# Patient Record
Sex: MALE | Race: WHITE | Employment: OTHER | URBAN - METROPOLITAN AREA
[De-identification: names, ages, dates, MRNs, and addresses within clinical notes are randomized per-mention and may not be internally consistent; named-entity substitution may affect disease eponyms.]

---

## 2015-06-26 LAB — COLONOSCOPY, EXTERNAL: NORMAL

## 2017-03-02 ENCOUNTER — OFFICE VISIT (OUTPATIENT)
Dept: ORTHOPEDIC SURGERY | Age: 67
End: 2017-03-02

## 2017-03-02 VITALS
OXYGEN SATURATION: 98 % | SYSTOLIC BLOOD PRESSURE: 110 MMHG | DIASTOLIC BLOOD PRESSURE: 62 MMHG | BODY MASS INDEX: 21.81 KG/M2 | WEIGHT: 161 LBS | HEIGHT: 72 IN | RESPIRATION RATE: 18 BRPM | TEMPERATURE: 98.3 F | HEART RATE: 65 BPM

## 2017-03-02 DIAGNOSIS — M47.899 FACET SYNDROME: ICD-10-CM

## 2017-03-02 DIAGNOSIS — S32.020A COMPRESSION FRACTURE OF L2, CLOSED, INITIAL ENCOUNTER (HCC): ICD-10-CM

## 2017-03-02 DIAGNOSIS — T14.8XXA SPRAIN AND STRAIN: Primary | ICD-10-CM

## 2017-03-02 RX ORDER — CYCLOBENZAPRINE HCL 10 MG
TABLET ORAL
Refills: 0 | COMMUNITY
Start: 2017-02-27 | End: 2017-12-15 | Stop reason: ALTCHOICE

## 2017-03-02 RX ORDER — HYDROCODONE BITARTRATE AND ACETAMINOPHEN 10; 325 MG/1; MG/1
TABLET ORAL
Refills: 0 | COMMUNITY
Start: 2017-02-27 | End: 2017-12-15 | Stop reason: ALTCHOICE

## 2017-03-02 RX ORDER — METHYLPREDNISOLONE 4 MG/1
TABLET ORAL
Refills: 0 | COMMUNITY
Start: 2017-02-27 | End: 2017-12-15 | Stop reason: ALTCHOICE

## 2017-03-02 NOTE — PROGRESS NOTES
[facility] [address].  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 He has had a lumbar CT which shows an L2 compression fracture. However, his pain appears to be attributed to a strain/sprain from his fall. He will do a HEP for his pain which will hopefully relieve itself with more time. Pt was advised to speak to his PCP about his bone density. Pt will f/u prn. Carla Canchola was seen today for back pain. Diagnoses and all orders for this visit:    Sprain and strain    Compression fracture of L2, closed, [facility]    Facet syndrome            Follow-up Disposition:  Return if symptoms worsen or fail to improve. CHIEF COMPLAINT  Skyler York is seen today in consultation at the request of Ruiz Loera MD for complaints of lumbar pain. HISTORY OF PRESENT ILLNESS  Skyler York is a 61 y.o. 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 Pt states he previously had SI joints several years ago which provided great relief. He was prescribed Norco and Prednisone recently but states that he did not take them. Coughing and sneezing exacerbate his pain. Pt denies any fevers, chills, nausea, vomiting. Pt denies any chest pain and shortness of breath. Pt denies any ear, nose, and throat problems. Pt denies any fecal or urinary incontinence. He works as a  part time.       PAST MEDICAL HISTORY   Past Medical History:   Diagnosis Date    BPH (benign prostatic hyperplasia)        Past Surgical History:   Procedure Laterality Date    HX CYST REMOVAL      ankle  HX OTHER SURGICAL      lap cholycystectomy post  proscedure oderset    HX OTHER SURGICAL      orthopedic surgery knee    HX OTHER SURGICAL      orthopedic surgery disc in neck       MEDICATIONS      Current Outpatient Prescriptions   Medication Sig Dispense Refill    SAW PALMETTO PO Take 300 mg by mouth.  RABEprazole (ACIPHEX) 20 mg tablet Take 20 mg by mouth daily.  GLUCOSAMINE/CHONDROITIN SULF A (GLUCOSAMINE-CHONDROITIN PO) Take  by mouth.  METOPROLOL SUCCINATE (TOPROL XL PO) Take  by mouth.  aspirin 81 mg tablet Take 81 mg by mouth.  cyclobenzaprine (FLEXERIL) 10 mg tablet   0    HYDROcodone-acetaminophen (NORCO)  mg tablet   0    methylPREDNISolone (MEDROL DOSEPACK) 4 mg tablet   0    TOPROL XL 25 mg XL tablet          ALLERGIES    Allergies   Allergen Reactions    Bactrim [Sulfamethoprim Ds] Nausea Only    Levaquin [Levofloxacin] Anxiety     Med him real sweaty          SOCIAL HISTORY    Social History     Social History    Marital status:      Spouse name: N/A    Number of children: N/A    Years of education: N/A     Social History Main Topics    Smoking status: Former Smoker    Smokeless tobacco: None    Alcohol use Yes    Drug use: None    Sexual activity: Not Asked     Other Topics Concern    None     Social History Narrative     Social History Narrative      Problem Relation Age of Onset    Cancer Other          REVIEW OF SYSTEMS  Review of Systems   Constitutional: Negative for chills, diaphoresis, fever, malaise/fatigue and weight loss. Respiratory: Negative for shortness of breath. Cardiovascular: Negative for chest pain and leg swelling. Gastrointestinal: Negative for constipation, nausea and vomiting. Neurological: Negative for dizziness, tingling, seizures, loss of consciousness and headaches. Psychiatric/Behavioral: The patient does not have insomnia.           PHYSICAL EXAMINATION  Visit Vitals    /62    Pulse 65    Temp 98.3 °F (36.8 °C) (Oral)    Resp 18    Ht 6' (1.829 m)    Wt 161 lb (73 kg)    SpO2 98%    BMI 21.84 kg/m2         No flowsheet data found. Constitutional:  Well developed, well nourished, in no acute distress. Psychiatric: Affect and mood are appropriate. HEENT: Normocephalic, atraumatic. Extraocular movements intact. Integumentary: No rashes or abrasions noted on exposed areas. Cardiovascular: Regular rate and rhythm. Pulmonary: Clear to auscultation bilaterally. SPINE/MUSCULOSKELETAL EXAM    Cervical spine:  Neck is midline. Normal muscle tone. No focal atrophy is noted. ROM pain free. Shoulder ROM intact. Tenderness to palpation. Negative Spurling's sign. Negative Tinel's sign. Negative Day's sign. Sensation in the bilateral arms grossly intact to light touch. Lumbar spine:  No rash, ecchymosis, or gross obliquity. No fasciculations. No focal atrophy is noted. No pain with hip ROM. Full range of motion. Tenderness to palpation. No tenderness to palpation at the sciatic notch. SI joints non-tender. Trochanters non tender. Pain with back extension. Sensation in the bilateral legs grossly intact to light touch. MOTOR:      Biceps  Triceps Deltoids Wrist Ext Wrist Flex Hand Intrin   Right 5/5 5/5 5/5 5/5 5/5 5/5   Left 5/5 5/5 5/5 5/5 5/5 5/5             Hip Flex  Quads Hamstrings Ankle DF EHL Ankle PF   Right 5/5 5/5 5/5 5/5 5/5 5/5   Left 5/5 5/5 5/5 5/5 5/5 5/5     DTRs are 2+ biceps, triceps, brachioradialis, patella, and Achilles. Negative Straight Leg raise. Squat not tested. No difficulty with tandem gait. Ambulation without assistive device. FWB. RADIOGRAPHS  Lumbar CT images taken on 2/27/2017 personally reviewed with patient:  Findings:    Bones: Mild acute superior endplate compression fracture of L2 with 30% loss in height and preservation of the posterior column.  Chronic right-sided spondylolysis at L5. Small T11 hemangioma. Alignment: Mild convex right curvature. 2 mm retrolisthesis L3 on L4, 1 mm L2 on L3, and 3 mm anterior subluxation L5 on S1. Disc interspaces and endplates: Moderate to severe degenerative disc narrowing at L3-4 and L5-S1, moderate at L2-3 mild at L1-2. Mild to moderate generalized endplate spurring relatively sparing L4-5 and T12-L1    Spinal canal: No significant central canal stenosis. Facet joints and posterior elements: Moderate bilateral L4-5 and severe L5-S1 degenerative facet arthropathy on the left and mild to moderate on the right. Foramina: Moderate to severe bilateral foraminal narrowing at L5-S1, moderate bilateral L3-4 and L4-5 and mild to moderate bilateral L2-3 and L1-2.  reviewed    Mr. Lennox Host has a reminder for a \"due or due soon\" health maintenance. I have asked that he contact his primary care provider for follow-up on this health maintenance. This plan was reviewed with the patient and patient agrees. All questions were answered. More than half of this visit today was spent on counseling. Written by Jonatan Morgan, as dictated by Dr. Sushant Olmedo. I, Dr. Sushant Olmedo, confirm that all documentation is accurate.

## 2017-03-02 NOTE — PATIENT INSTRUCTIONS
Low Back Pain: Exercises  Your Care Instructions  Here are some examples of typical rehabilitation exercises for your condition. Start each exercise slowly. Ease off the exercise if you start to have pain. Your doctor or physical therapist will tell you when you can start these exercises and which ones will work best for you. How to do the exercises  Press-up    1. Lie on your stomach, supporting your body with your forearms. 2. Press your elbows down into the floor to raise your upper back. As you do this, relax your stomach muscles and allow your back to arch without using your back muscles. As your press up, do not let your hips or pelvis come off the floor. 3. Hold for 15 to 30 seconds, then relax. 4. Repeat 2 to 4 times. Alternate arm and leg (bird dog) exercise    Note: Do this exercise slowly. Try to keep your body straight at all times, and do not let one hip drop lower than the other. 1. Start on the floor, on your hands and knees. 2. Tighten your belly muscles. 3. Raise one leg off the floor, and hold it straight out behind you. Be careful not to let your hip drop down, because that will twist your trunk. 4. Hold for about 6 seconds, then lower your leg and switch to the other leg. 5. Repeat 8 to 12 times on each leg. 6. Over time, work up to holding for 10 to 30 seconds each time. 7. If you feel stable and secure with your leg raised, try raising the opposite arm straight out in front of you at the same time. Knee-to-chest exercise    1. Lie on your back with your knees bent and your feet flat on the floor. 2. Bring one knee to your chest, keeping the other foot flat on the floor (or keeping the other leg straight, whichever feels better on your lower back). 3. Keep your lower back pressed to the floor. Hold for at least 15 to 30 seconds. 4. Relax, and lower the knee to the starting position. 5. Repeat with the other leg. Repeat 2 to 4 times with each leg.   6. To get more stretch, put your other leg flat on the floor while pulling your knee to your chest.  Curl-ups    1. Lie on the floor on your back with your knees bent at a 90-degree angle. Your feet should be flat on the floor, about 12 inches from your buttocks. 2. Cross your arms over your chest. If this bothers your neck, try putting your hands behind your neck (not your head), with your elbows spread apart. 3. Slowly tighten your belly muscles and raise your shoulder blades off the floor. 4. Keep your head in line with your body, and do not press your chin to your chest.  5. Hold this position for 1 or 2 seconds, then slowly lower yourself back down to the floor. 6. Repeat 8 to 12 times. Pelvic tilt exercise    1. Lie on your back with your knees bent. 2. \"Brace\" your stomach. This means to tighten your muscles by pulling in and imagining your belly button moving toward your spine. You should feel like your back is pressing to the floor and your hips and pelvis are rocking back. 3. Hold for about 6 seconds while you breathe smoothly. 4. Repeat 8 to 12 times. Heel dig bridging    1. Lie on your back with both knees bent and your ankles bent so that only your heels are digging into the floor. Your knees should be bent about 90 degrees. 2. Then push your heels into the floor, squeeze your buttocks, and lift your hips off the floor until your shoulders, hips, and knees are all in a straight line. 3. Hold for about 6 seconds as you continue to breathe normally, and then slowly lower your hips back down to the floor and rest for up to 10 seconds. 4. Do 8 to 12 repetitions. Hamstring stretch in doorway    1. Lie on your back in a doorway, with one leg through the open door. 2. Slide your leg up the wall to straighten your knee. You should feel a gentle stretch down the back of your leg. 3. Hold the stretch for at least 15 to 30 seconds. Do not arch your back, point your toes, or bend either knee.  Keep one heel touching the floor and the other heel touching the wall. 4. Repeat with your other leg. 5. Do 2 to 4 times for each leg. Hip flexor stretch    1. Kneel on the floor with one knee bent and one leg behind you. Place your forward knee over your foot. Keep your other knee touching the floor. 2. Slowly push your hips forward until you feel a stretch in the upper thigh of your rear leg. 3. Hold the stretch for at least 15 to 30 seconds. Repeat with your other leg. 4. Do 2 to 4 times on each side. Wall sit    1. Stand with your back 10 to 12 inches away from a wall. 2. Lean into the wall until your back is flat against it. 3. Slowly slide down until your knees are slightly bent, pressing your lower back into the wall. 4. Hold for about 6 seconds, then slide back up the wall. 5. Repeat 8 to 12 times. Follow-up care is a key part of your treatment and safety. Be sure to make and go to all appointments, and call your doctor if you are having problems. It's also a good idea to know your test results and keep a list of the medicines you take. Where can you learn more? Go to http://nura-milton.info/. Enter J592 in the search box to learn more about \"Low Back Pain: Exercises. \"  Current as of: May 23, 2016  Content Version: 11.1  © 8892-4524 [employer]. Care instructions adapted under license by [employer] (which disclaims liability or warranty for this information). If you have questions about a medical condition or this instruction, always ask your healthcare professional. [place] [address] any warranty or liability for your use of this information. Healthy Upper Back: Exercises  Your Care Instructions  Here are some examples of exercises for your upper back. Start each exercise slowly. Ease off the exercise if you start to have pain.   Your doctor or physical therapist will tell you when you can start these exercises and which ones will work best for you.  How to do the exercises  Lower neck and upper back stretch    5. Stretch your arms out in front of your body. Clasp one hand on top of your other hand. 6. Gently reach out so that you feel your shoulder blades stretching away from each other. 7. Gently bend your head forward. 8. Hold for 15 to 30 seconds. 9. Repeat 2 to 4 times. Midback stretch    Note: If you have knee pain, do not do this exercise. 8. Kneel on the floor, and sit back on your ankles. 9. Lean forward, place your hands on the floor, and stretch your arms out in front of you. Rest your head between your arms. 10. Gently push your chest toward the floor, reaching as far in front of you as possible. 11. Hold for 15 to 30 seconds. 12. Repeat 2 to 4 times. Shoulder rolls    7. Sit comfortably with your feet shoulder-width apart. You can also do this exercise while standing. 8. Roll your shoulders up, then back, and then down in a smooth, circular motion. 9. Repeat 2 to 4 times. Wall push-up    7. Stand against a wall with your feet about 12 to 24 inches back from the wall. If you feel any pain when you do this exercise, stand closer to the wall. 8. Place your hands on the wall slightly wider apart than your shoulders, and lean forward. 9. Gently lean your body toward the wall. Then push back to your starting position. Keep the motion smooth and controlled. 10. Repeat 8 to 12 times. Resisted shoulder blade squeeze    Note: For this exercise, you will need elastic exercise material, such as surgical tubing or Thera-Band.  5. Sit or stand, holding the band in both hands in front of you. Keep your elbows close to your sides, bent at a 90-degree angle. Your palms should face up. 6. Squeeze your shoulder blades together, and move your arms to the outside, stretching the band. Be sure to keep your elbows at your sides while you do this. 7. Relax. 8. Repeat 8 to 12 times.   Resisted rows    Note: For this exercise, you will need elastic exercise material, such as surgical tubing or Thera-Band. 5. Put the band around a solid object, such as a bedpost, at about waist level. Hold one end of the band in each hand. 6. With your elbows at your sides and bent to 90 degrees, pull the band back to move your shoulder blades toward each other. Return to the starting position. 7. Repeat 8 to 12 times. Follow-up care is a key part of your treatment and safety. Be sure to make and go to all appointments, and call your doctor if you are having problems. It's also a good idea to know your test results and keep a list of the medicines you take. Where can you learn more? Go to http://nura-milton.info/. Enter I323 in the search box to learn more about \"Healthy Upper Back: Exercises. \"  Current as of: May 23, 2016  Content Version: 11.1  © 6221-0814 [employer]. Care instructions adapted under license by [employer] (which disclaims liability or warranty for this information). If you have questions about a medical condition or this instruction, always ask your healthcare professional. [place] [address] any warranty or liability for your use of this information. Neck: Exercises  Your Care Instructions  Here are some examples of typical rehabilitation exercises for your condition. Start each exercise slowly. Ease off the exercise if you start to have pain. Your doctor or physical therapist will tell you when you can start these exercises and which ones will work best for you. How to do the exercises  Note: Stretching should make you feel a gentle stretch, but no pain. Stop any strengthening exercise that makes pain worse. Neck stretch    10. This stretch works best if you keep your shoulder down as you lean away from it. To help you remember to do this, start by relaxing your shoulders and lightly holding on to your thighs or your chair.   11. Tilt your head toward your shoulder and hold for 15 to 30 seconds. Let the weight of your head stretch your muscles. 12. If you would like a little added stretch, use your hand to gently and steadily pull your head toward your shoulder. For example, keeping your right shoulder down, lean your head to the left. 13. Repeat 2 to 4 times toward each shoulder. Diagonal neck stretch    13. Turn your head slightly toward the direction you will be stretching, and tilt your head diagonally toward your chest and hold for 15 to 30 seconds. 14. If you would like a little added stretch, use your hand to gently and steadily pull your head forward on the diagonal.  15. Repeat 2 to 4 times toward each side. Dorsal glide stretch    10. Sit or stand tall and look straight ahead. 11. Slowly tuck your chin as you glide your head backward over your body  12. Hold for a count of 6, and then relax for up to 10 seconds. 13. Repeat 8 to 12 times. Note: The dorsal glide stretches the back of the neck. If you feel pain, do not glide so far back. Some people find this exercise easier to do while lying on their backs with an ice pack on the neck. Chest and shoulder stretch    11. Sit or stand tall and glide your head backward as in the dorsal glide stretch. 12. Raise both arms so that your hands are next to your ears. 13. Take a deep breath, and as you breathe out, lower your elbows down and behind your back. You will feel your shoulder blades slide down and together, and at the same time you will feel a stretch across your chest and the front of your shoulders. 14. Hold for about 6 seconds, and then relax for up to 10 seconds. 15. Repeat 8 to 12 times. Strengthening: Hands on head    9. Move your head backward, forward, and side to side against gentle pressure from your hands, holding each position for about 6 seconds. 10. Repeat 8 to 12 times. Follow-up care is a key part of your treatment and safety.  Be sure to make and go to all appointments, and call your doctor if you are having problems. It's also a good idea to know your test results and keep a list of the medicines you take. Where can you learn more? Go to http://nura-milton.info/. Enter P975 in the search box to learn more about \"Neck: Exercises. \"  Current as of: May 23, 2016  Content Version: 11.1  © 5841-5443 [employer]. Care instructions adapted under license by [employer] (which disclaims liability or warranty for this information). If you have questions about a medical condition or this instruction, always ask your healthcare professional. [place] [address] any warranty or liability for your use of this information.

## 2018-08-02 LAB — LDL-C, EXTERNAL: 116

## 2019-06-07 PROBLEM — K21.9 GASTROESOPHAGEAL REFLUX DISEASE WITHOUT ESOPHAGITIS: Status: ACTIVE | Noted: 2017-06-19

## 2019-06-07 PROBLEM — I49.3 PVC'S (PREMATURE VENTRICULAR CONTRACTIONS): Status: ACTIVE | Noted: 2017-06-19

## 2019-06-07 PROBLEM — G89.29 CHRONIC PAIN OF RIGHT KNEE: Status: ACTIVE | Noted: 2017-12-19

## 2019-06-07 PROBLEM — M17.11 PRIMARY LOCALIZED OSTEOARTHRITIS OF RIGHT KNEE: Status: ACTIVE | Noted: 2018-06-18

## 2019-06-07 PROBLEM — M25.561 CHRONIC PAIN OF RIGHT KNEE: Status: ACTIVE | Noted: 2017-12-19

## 2020-10-27 ENCOUNTER — OFFICE VISIT (OUTPATIENT)
Dept: INTERNAL MEDICINE CLINIC | Age: 70
End: 2020-10-27
Payer: COMMERCIAL

## 2020-10-27 VITALS
DIASTOLIC BLOOD PRESSURE: 70 MMHG | RESPIRATION RATE: 20 BRPM | HEIGHT: 73 IN | TEMPERATURE: 97.5 F | BODY MASS INDEX: 21.47 KG/M2 | WEIGHT: 162 LBS | SYSTOLIC BLOOD PRESSURE: 110 MMHG

## 2020-10-27 DIAGNOSIS — T14.8XXA PUNCTURE WOUND: Primary | ICD-10-CM

## 2020-10-27 PROCEDURE — 99213 OFFICE O/P EST LOW 20 MIN: CPT | Performed by: INTERNAL MEDICINE

## 2020-10-27 RX ORDER — MUPIROCIN 20 MG/G
OINTMENT TOPICAL 2 TIMES DAILY
Qty: 22 G | Refills: 1 | Status: SHIPPED | OUTPATIENT
Start: 2020-10-27 | End: 2021-02-25 | Stop reason: ALTCHOICE

## 2020-10-27 RX ORDER — SULFAMETHOXAZOLE AND TRIMETHOPRIM 800; 160 MG/1; MG/1
1 TABLET ORAL 2 TIMES DAILY
Qty: 20 TAB | Refills: 0 | Status: SHIPPED | OUTPATIENT
Start: 2020-10-27 | End: 2020-11-04

## 2020-10-27 NOTE — PROGRESS NOTES
Jae Wesley is a 78 y.o. male presenting for           Chief Complaint   Patient presents with    Puncture Wound        HPI leg wound x 3 weeks,clear drainage surrounding erythema. no fever    Past Medical History:   Diagnosis Date    Benign prostatic hyperplasia with urinary retention     BPH (benign prostatic hyperplasia)     BPH with obstruction/lower urinary tract symptoms     Epididymal cyst     Epididymitis, left     Incomplete bladder emptying         Current Outpatient Medications on File Prior to Visit   Medication Sig Dispense Refill    finasteride (PROSCAR) 5 mg tablet Take 1 Tab by mouth daily. 30 Tab 0    meloxicam (MOBIC) 15 mg tablet take 1 tablet by mouth once daily  0    saw palmetto 160 mg cap Take  by mouth.  esomeprazole (NEXIUM) 20 mg capsule Take  by mouth daily.  GLUCOSAMINE/CHONDROITIN SULF A (GLUCOSAMINE-CHONDROITIN PO) Take  by mouth.  METOPROLOL SUCCINATE (TOPROL XL PO) Take  by mouth.  aspirin 81 mg tablet Take 81 mg by mouth.  finasteride (PROSCAR) 5 mg tablet Take 1 Tab by mouth daily. 90 Tab 4    tamsulosin (FLOMAX) 0.4 mg capsule Take 1 Cap by mouth daily (after dinner). 90 Cap 3    tamsulosin (FLOMAX) 0.4 mg capsule Take 1 Cap by mouth daily (after dinner). 30 Cap 3    finasteride (PROSCAR) 5 mg tablet Take 1 Tab by mouth daily. 90 Tab 3     No current facility-administered medications on file prior to visit. ROS as in hpi    Visit Vitals  /70 (BP 1 Location: Right arm, BP Patient Position: Sitting)   Temp 97.5 °F (36.4 °C)   Resp 20   Ht 6' 1\" (1.854 m)   Wt 162 lb (73.5 kg)   BMI 21.37 kg/m²        Physical Exam 5x3 cm open superficial wound, surrounding erythema.     Assessment & PlanAbx, mupirocin

## 2021-01-18 VITALS
HEART RATE: 60 BPM | WEIGHT: 164 LBS | HEIGHT: 72 IN | SYSTOLIC BLOOD PRESSURE: 128 MMHG | TEMPERATURE: 98.4 F | BODY MASS INDEX: 22.21 KG/M2 | DIASTOLIC BLOOD PRESSURE: 70 MMHG

## 2021-01-18 PROBLEM — J01.90 ACUTE SINUSITIS: Status: ACTIVE | Noted: 2021-01-18

## 2021-01-18 PROBLEM — K21.9 GERD (GASTROESOPHAGEAL REFLUX DISEASE): Status: ACTIVE | Noted: 2021-01-18

## 2021-01-18 PROBLEM — M19.90 OSTEOARTHROSIS: Status: ACTIVE | Noted: 2021-01-18

## 2021-01-18 PROBLEM — E78.5 DYSLIPIDEMIA: Status: ACTIVE | Noted: 2021-01-18

## 2021-01-18 PROBLEM — N41.9 PROSTATITIS: Status: ACTIVE | Noted: 2021-01-18

## 2021-01-18 PROBLEM — N40.0 BPH (BENIGN PROSTATIC HYPERPLASIA): Status: ACTIVE | Noted: 2021-01-18

## 2021-01-18 PROBLEM — I49.3 PVCS (PREMATURE VENTRICULAR CONTRACTIONS): Status: ACTIVE | Noted: 2021-01-18

## 2021-01-18 RX ORDER — CYCLOBENZAPRINE HCL 10 MG
TABLET ORAL
COMMUNITY

## 2021-01-18 RX ORDER — DIAZEPAM 5 MG/1
5 TABLET ORAL
COMMUNITY

## 2021-01-18 RX ORDER — PREGABALIN 75 MG/1
CAPSULE ORAL
COMMUNITY
End: 2021-02-25 | Stop reason: ALTCHOICE

## 2021-01-18 RX ORDER — TRAMADOL HYDROCHLORIDE 50 MG/1
50 TABLET ORAL
COMMUNITY
End: 2021-02-25

## 2021-01-18 RX ORDER — METHYLPREDNISOLONE 4 MG/1
TABLET ORAL
COMMUNITY
End: 2021-02-25 | Stop reason: ALTCHOICE

## 2021-01-18 RX ORDER — HYDROCODONE BITARTRATE AND ACETAMINOPHEN 10; 325 MG/1; MG/1
1 TABLET ORAL
COMMUNITY
End: 2021-02-25 | Stop reason: ALTCHOICE

## 2021-01-18 RX ORDER — OMEPRAZOLE 20 MG/1
20 CAPSULE, DELAYED RELEASE ORAL DAILY
COMMUNITY
End: 2021-02-25 | Stop reason: ALTCHOICE

## 2021-01-18 RX ORDER — PREDNISONE 10 MG/1
TABLET ORAL
COMMUNITY
End: 2021-02-25 | Stop reason: ALTCHOICE

## 2021-01-18 RX ORDER — AZITHROMYCIN 250 MG/1
250 TABLET, FILM COATED ORAL DAILY
COMMUNITY
End: 2021-02-25 | Stop reason: ALTCHOICE

## 2021-01-18 RX ORDER — BROMPHENIRAMINE MALEATE, PSEUDOEPHEDRINE HYDROCHLORIDE, AND DEXTROMETHORPHAN HYDROBROMIDE 2; 30; 10 MG/5ML; MG/5ML; MG/5ML
SYRUP ORAL
COMMUNITY
End: 2021-02-25 | Stop reason: ALTCHOICE

## 2021-01-18 RX ORDER — CIPROFLOXACIN 500 MG/1
TABLET ORAL 2 TIMES DAILY
COMMUNITY
End: 2021-02-25 | Stop reason: ALTCHOICE

## 2021-01-18 RX ORDER — AMOXICILLIN 500 MG/1
500 CAPSULE ORAL
COMMUNITY
End: 2021-02-25 | Stop reason: ALTCHOICE

## 2021-01-18 RX ORDER — RABEPRAZOLE SODIUM 20 MG/1
20 TABLET, DELAYED RELEASE ORAL DAILY
COMMUNITY
End: 2021-02-25 | Stop reason: ALTCHOICE

## 2021-01-18 RX ORDER — OXYCODONE HYDROCHLORIDE 5 MG/1
5 TABLET ORAL
COMMUNITY
End: 2021-02-25 | Stop reason: ALTCHOICE

## 2021-02-25 ENCOUNTER — OFFICE VISIT (OUTPATIENT)
Dept: INTERNAL MEDICINE CLINIC | Age: 71
End: 2021-02-25
Payer: COMMERCIAL

## 2021-02-25 VITALS
SYSTOLIC BLOOD PRESSURE: 116 MMHG | WEIGHT: 166.6 LBS | DIASTOLIC BLOOD PRESSURE: 66 MMHG | HEIGHT: 73 IN | RESPIRATION RATE: 20 BRPM | BODY MASS INDEX: 22.08 KG/M2 | TEMPERATURE: 97.3 F

## 2021-02-25 DIAGNOSIS — R35.1 BENIGN PROSTATIC HYPERPLASIA WITH NOCTURIA: Primary | ICD-10-CM

## 2021-02-25 DIAGNOSIS — N40.1 BENIGN PROSTATIC HYPERPLASIA WITH NOCTURIA: Primary | ICD-10-CM

## 2021-02-25 DIAGNOSIS — I49.3 PVC'S (PREMATURE VENTRICULAR CONTRACTIONS): ICD-10-CM

## 2021-02-25 DIAGNOSIS — N50.3 EPIDIDYMAL CYST: ICD-10-CM

## 2021-02-25 PROCEDURE — 99213 OFFICE O/P EST LOW 20 MIN: CPT | Performed by: INTERNAL MEDICINE

## 2021-02-25 RX ORDER — METOPROLOL SUCCINATE 25 MG/1
25 TABLET, EXTENDED RELEASE ORAL DAILY
Qty: 90 TAB | Refills: 3 | Status: SHIPPED | OUTPATIENT
Start: 2021-02-25

## 2021-02-25 NOTE — PROGRESS NOTES
Fady Hay is a 78 y.o. male presenting for evaluation prior to a planned laser prostatectomy by Dr. Myra Ware          Chief Complaint   Patient presents with    Pre-op Exam        HPI Balaji Velasquez has been seen struggling with prostate issues for all a while now. He is intolerant of Flomax which causes orthostasis and he is now looking forward to having a laser prostatectomy done by Dr. Myra Ware. While he is having the surgery he is going to have a 2 x 2 inch epididymal cyst removed at the same time. He says the cyst is uncomfortable and hurts from time to time and Dr. Myra Ware has agreed to do the procedures at the same time. 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 He stays active without limitation. He has a history of PVCs that are well controlled with medication and sees cardiology once a year. Past Medical History:   Diagnosis Date    Benign prostatic hyperplasia with urinary retention     BPH (benign prostatic hyperplasia)     BPH with obstruction/lower urinary tract symptoms     Epididymal cyst     Epididymitis, left     Incomplete bladder emptying         Current Outpatient Medications on File Prior to Visit   Medication Sig Dispense Refill    bisacodyL (DULCOLAX) 5 mg EC tablet bisacodyl 5 mg tablet,delayed release   take 5 milligrams by mouth as directed      finasteride (PROSCAR) 5 mg tablet Take 1 Tab by mouth daily. 90 Tab 4    vit A/vit C/vit E/zinc/copper (OCUVITE PRESERVISION PO) Take  by mouth.  cyclobenzaprine (FLEXERIL) 10 mg tablet Take  by mouth three (3) times daily as needed for Muscle Spasm(s).  diazePAM (VALIUM) 5 mg tablet Take 5 mg by mouth every six (6) hours as needed for Anxiety.  saw palmetto 160 mg cap Take  by mouth.  esomeprazole (NEXIUM) 20 mg capsule Take  by mouth daily.  METOPROLOL SUCCINATE (TOPROL XL PO) Take 25 mg by mouth daily.  aspirin 81 mg tablet Take 81 mg by mouth.         [DISCONTINUED] polyethylene glycol (MIRALAX) 17 gram/dose powder Laxative PEG 3350 17 gram/dose oral powder   take as directed in [address] [facility] [DISCONTINUED] ciprofloxacin HCl (CIPRO) 500 mg tablet Take  by mouth two (2) times a day.  [DISCONTINUED] omeprazole (PRILOSEC) 20 mg capsule Take 20 mg by mouth daily.  [DISCONTINUED] oxyCODONE IR (ROXICODONE) 5 mg immediate release tablet Take 5 mg by mouth every four (4) hours as needed for Pain.  [DISCONTINUED] pregabalin (LYRICA) 75 mg capsule Take  by mouth.  [DISCONTINUED] traMADoL (ULTRAM) 50 mg tablet Take 50 mg by mouth every six (6) hours as needed for Pain.  [DISCONTINUED] amoxicillin (AMOXIL) 500 mg capsule Take 500 mg by mouth.  [DISCONTINUED] azithromycin (ZITHROMAX) 250 mg tablet Take 250 mg by mouth daily.  [DISCONTINUED] brompheniramine-pseudoeph-DM (DIMETAPP) 2-30-10 mg/5 mL syrup Take  by mouth four (4) times daily as needed for Congestion, Cough or Rhinitis.  [DISCONTINUED] HYDROcodone-acetaminophen (NORCO)  mg tablet Take 1 Tab by mouth.  [DISCONTINUED] methylPREDNISolone (MEDROL DOSEPACK) 4 mg tablet Take  by mouth.  [DISCONTINUED] predniSONE (DELTASONE) 10 mg tablet Take  by mouth daily (with breakfast).  [DISCONTINUED] RABEprazole (ACIPHEX) 20 mg TbEC Take 20 mg by mouth daily.  [DISCONTINUED] tamsulosin HCl (TAMSULOSIN PO) Take  by mouth.  [DISCONTINUED] mupirocin (BACTROBAN) 2 % ointment Apply  to affected area two (2) times a day. 22 g 1    [DISCONTINUED] meloxicam (MOBIC) 15 mg tablet take 1 tablet by mouth once daily  0    [DISCONTINUED] GLUCOSAMINE/CHONDROITIN SULF A (GLUCOSAMINE-CHONDROITIN PO) Take  by mouth. No current facility-administered medications on file prior to visit. ROS all systems are reviewed and negative.     Visit Vitals  /66 (BP 1 Location: Right arm, BP Patient Position: Sitting, BP Cuff Size: Adult)   Temp 97.3 °F (36.3 °C)   Resp 20   Ht 6' 1\" (1.854 m)   Wt 166 lb 9.6 oz (75.6 kg)   BMI 21.98 kg/m²        Physical Exam well-developed well-nourished  man alert oriented cooperative no acute distress normocephalic conjunctiva pink sclera anicteric neck supple no lymphadenopathy lungs bilaterally clear to auscultation heart regular rhythm no extrasystoles appreciated today abdomen is soft and nontender there is no mass or organomegaly no rebound or peritoneal irritation extremities reveals excellent posterior tibial and dorsalis pedis pulses bilaterally neurological is physiologic I did not examine his genitalia as that is performed by Dr. Samara Saint. Assessment & Plan Anais Tang says he has a paper in his truck ordering an EKG chest x-ray and lab work and he is going to [place] when he leaves here to have that done. He told me that he would have them send me copies of the results.   Barring any unforeseen circumstances if his preop testing looks okay I feel like he would be an acceptable candidate for the planned surgical procedure

## 2021-02-25 NOTE — PROGRESS NOTES
Manuel Larose presents today for   Chief Complaint   Patient presents with    Pre-op Exam       Is someone accompanying this pt? no    Is the patient using any DME equipment during OV? no    Depression Screening:  3 most recent PHQ Screens 2/25/2021   Little interest or pleasure in doing things Not at all   Feeling down, depressed, irritable, or hopeless Not at all   Total Score PHQ 2 0       Learning Assessment:  Learning Assessment 2/25/2021   PRIMARY LEARNER Patient   HIGHEST LEVEL OF EDUCATION - PRIMARY LEARNER  2 YEARS Balaji PRIMARY LEARNER NONE   CO-LEARNER CAREGIVER No   PRIMARY LANGUAGE ENGLISH   LEARNER PREFERENCE PRIMARY VIDEOS   ANSWERED BY patient   RELATIONSHIP SELF       Abuse Screening:  Abuse Screening Questionnaire 2/25/2021   Do you ever feel afraid of your partner? N   Are you in a relationship with someone who physically or mentally threatens you? N   Is it safe for you to go home? Y       Fall Risk  Fall Risk Assessment, last 12 mths 2/25/2021   Able to walk? Yes   Fall in past 12 months? 0   Do you feel unsteady? 0   Are you worried about falling 0       ADL  ADL Assessment 2/25/2021   Feeding yourself No Help Needed   Getting from bed to chair No Help Needed   Getting dressed No Help Needed   Bathing or showering No Help Needed   Walk across the room (includes cane/walker) No Help Needed   Using the telphone No Help Needed   Taking your medications No Help Needed   Preparing meals No Help Needed   Managing money (expenses/bills) No Help Needed   Moderately strenuous housework (laundry) No Help Needed   Shopping for personal items (toiletries/medicines) No Help Needed   Shopping for groceries No Help Needed   Driving No Help Needed   Climbing a flight of stairs No Help Needed   Getting to places beyond walking distances No Help Needed       Health Maintenance reviewed and discussed and ordered per Provider.     Health Maintenance Due   Topic Date Due    Hepatitis C Screening  1950    COVID-19 Vaccine (1 of 2) 04/01/1968    Lipid Screen  04/01/1992    Colorectal Cancer Screening Combo  04/01/2002    GLAUCOMA SCREENING Q2Y  04/01/2017    Pneumococcal 65+ years (1 of 1 - PPSV23) 04/01/2017    Medicare Yearly Exam  11/04/2020   . Coordination of Care:  1. Have you been to the ER, urgent care clinic since your last visit? Hospitalized since your last visit? no    2. Have you seen or consulted any other health care providers outside of the [address] [place] [address] since your last visit? Include any pap smears or colon screening.  Yes, urology

## 2021-08-03 PROBLEM — K21.9 GERD (GASTROESOPHAGEAL REFLUX DISEASE): Status: RESOLVED | Noted: 2021-01-18 | Resolved: 2021-08-03

## 2022-03-18 PROBLEM — N40.0 BPH (BENIGN PROSTATIC HYPERPLASIA): Status: ACTIVE | Noted: 2021-01-18

## 2022-03-18 PROBLEM — K21.9 GASTROESOPHAGEAL REFLUX DISEASE WITHOUT ESOPHAGITIS: Status: ACTIVE | Noted: 2017-06-19

## 2022-03-18 PROBLEM — M17.11 PRIMARY LOCALIZED OSTEOARTHRITIS OF RIGHT KNEE: Status: ACTIVE | Noted: 2018-06-18

## 2022-03-18 PROBLEM — M25.561 CHRONIC PAIN OF RIGHT KNEE: Status: ACTIVE | Noted: 2017-12-19

## 2022-03-18 PROBLEM — G89.29 CHRONIC PAIN OF RIGHT KNEE: Status: ACTIVE | Noted: 2017-12-19

## 2022-03-19 PROBLEM — N41.9 PROSTATITIS: Status: ACTIVE | Noted: 2021-01-18

## 2022-03-19 PROBLEM — I49.3 PVC'S (PREMATURE VENTRICULAR CONTRACTIONS): Status: ACTIVE | Noted: 2017-06-19

## 2022-03-19 PROBLEM — J01.90 ACUTE SINUSITIS: Status: ACTIVE | Noted: 2021-01-18

## 2022-03-19 PROBLEM — M19.90 OSTEOARTHROSIS: Status: ACTIVE | Noted: 2021-01-18

## 2022-03-19 PROBLEM — I49.3 PVCS (PREMATURE VENTRICULAR CONTRACTIONS): Status: ACTIVE | Noted: 2021-01-18

## 2022-03-20 PROBLEM — T14.8XXA PUNCTURE WOUND: Status: ACTIVE | Noted: 2020-10-27

## 2022-03-20 PROBLEM — E78.5 DYSLIPIDEMIA: Status: ACTIVE | Noted: 2021-01-18

## 2023-02-06 ENCOUNTER — TRANSCRIBE ORDER (OUTPATIENT)
Dept: REGISTRATION | Age: 73
End: 2023-02-06

## 2023-02-06 ENCOUNTER — HOSPITAL ENCOUNTER (OUTPATIENT)
Dept: LAB | Age: 73
Discharge: HOME | End: 2023-02-06
Payer: COMMERCIAL

## 2023-02-06 DIAGNOSIS — M47.26 OTHER SPONDYLOSIS WITH RADICULOPATHY, LUMBAR REGION: ICD-10-CM

## 2023-02-06 DIAGNOSIS — M43.17 ACQUIRED SPONDYLOLISTHESIS OF LUMBOSACRAL REGION: ICD-10-CM

## 2023-02-06 DIAGNOSIS — M47.26 OTHER SPONDYLOSIS WITH RADICULOPATHY, LUMBAR REGION: Primary | ICD-10-CM

## 2023-02-06 LAB
ALBUMIN SERPL-MCNC: 3.4 G/DL (ref 3.4–5)
ALBUMIN/GLOB SERPL: 0.9 (ref 0.8–1.7)
ALP SERPL-CCNC: 94 U/L (ref 45–117)
ALT SERPL-CCNC: 53 U/L (ref 16–61)
ANION GAP SERPL CALC-SCNC: 4 MMOL/L (ref 3–18)
AST SERPL W P-5'-P-CCNC: 28 U/L (ref 10–38)
BILIRUB SERPL-MCNC: 0.5 MG/DL (ref 0.2–1)
BUN SERPL-MCNC: 14 MG/DL (ref 7–18)
BUN/CREAT SERPL: 16 (ref 12–20)
CA-I BLD-MCNC: 9.2 MG/DL (ref 8.5–10.1)
CHLORIDE SERPL-SCNC: 104 MMOL/L (ref 100–111)
CO2 SERPL-SCNC: 32 MMOL/L (ref 21–32)
CREAT SERPL-MCNC: 0.89 MG/DL (ref 0.6–1.3)
ERYTHROCYTE [DISTWIDTH] IN BLOOD BY AUTOMATED COUNT: 12 % (ref 11.6–14.5)
GLOBULIN SER CALC-MCNC: 3.7 G/DL (ref 2–4)
GLUCOSE SERPL-MCNC: 107 MG/DL (ref 74–99)
HCT VFR BLD AUTO: 49.7 % (ref 36–48)
HGB BLD-MCNC: 17.3 G/DL (ref 13–16)
MCH RBC QN AUTO: 33 PG (ref 24–34)
MCHC RBC AUTO-ENTMCNC: 34.8 G/DL (ref 31–37)
MCV RBC AUTO: 94.8 FL (ref 78–100)
NRBC # BLD: 0 K/UL (ref 0–0.01)
NRBC BLD-RTO: 0 PER 100 WBC
PLATELET # BLD AUTO: 209 K/UL (ref 135–420)
PMV BLD AUTO: 10.5 FL (ref 9.2–11.8)
POTASSIUM SERPL-SCNC: 3.9 MMOL/L (ref 3.5–5.5)
PROT SERPL-MCNC: 7.1 G/DL (ref 6.4–8.2)
RBC # BLD AUTO: 5.24 M/UL (ref 4.35–5.65)
SODIUM SERPL-SCNC: 140 MMOL/L (ref 136–145)
WBC # BLD AUTO: 4.9 K/UL (ref 4.6–13.2)

## 2023-02-06 PROCEDURE — 85027 COMPLETE CBC AUTOMATED: CPT

## 2023-02-06 PROCEDURE — 80053 COMPREHEN METABOLIC PANEL: CPT

## 2023-02-06 PROCEDURE — 36415 COLL VENOUS BLD VENIPUNCTURE: CPT

## 2023-02-07 LAB
BACTERIA SPEC CULT: ABNORMAL
BACTERIA SPEC CULT: ABNORMAL
SPECIAL REQUESTS,SREQ: ABNORMAL

## 2024-09-11 ENCOUNTER — HOSPITAL ENCOUNTER (OUTPATIENT)
Facility: HOSPITAL | Age: 74
Discharge: HOME | End: 2024-09-14
Payer: MEDICARE

## 2024-09-11 DIAGNOSIS — M43.12 SPONDYLOLISTHESIS OF CERVICAL REGION: ICD-10-CM

## 2024-09-11 LAB
ALBUMIN SERPL-MCNC: 3.5 G/DL (ref 3.4–5)
ALBUMIN/GLOB SERPL: 1.3 (ref 0.8–1.7)
ALP SERPL-CCNC: 98 U/L (ref 45–117)
ALT SERPL-CCNC: 29 U/L (ref 16–61)
ANION GAP SERPL CALC-SCNC: 6 MMOL/L (ref 3–18)
AST SERPL-CCNC: 23 U/L (ref 10–38)
BILIRUB SERPL-MCNC: 0.5 MG/DL (ref 0.2–1)
BUN SERPL-MCNC: 13 MG/DL (ref 7–18)
BUN/CREAT SERPL: 16 (ref 12–20)
CALCIUM SERPL-MCNC: 8.9 MG/DL (ref 8.5–10.1)
CHLORIDE SERPL-SCNC: 107 MMOL/L (ref 100–111)
CO2 SERPL-SCNC: 29 MMOL/L (ref 21–32)
CREAT SERPL-MCNC: 0.79 MG/DL (ref 0.6–1.3)
EKG ATRIAL RATE: 51 BPM
EKG DIAGNOSIS: NORMAL
EKG P AXIS: 72 DEGREES
EKG P-R INTERVAL: 126 MS
EKG Q-T INTERVAL: 424 MS
EKG QRS DURATION: 90 MS
EKG QTC CALCULATION (BAZETT): 390 MS
EKG R AXIS: -26 DEGREES
EKG T AXIS: 63 DEGREES
EKG VENTRICULAR RATE: 51 BPM
ERYTHROCYTE [DISTWIDTH] IN BLOOD BY AUTOMATED COUNT: 12.5 % (ref 11.6–14.5)
GLOBULIN SER CALC-MCNC: 2.8 G/DL (ref 2–4)
GLUCOSE SERPL-MCNC: 88 MG/DL (ref 74–99)
HCT VFR BLD AUTO: 45.3 % (ref 36–48)
HGB BLD-MCNC: 15.6 G/DL (ref 13–16)
MCH RBC QN AUTO: 33.5 PG (ref 24–34)
MCHC RBC AUTO-ENTMCNC: 34.4 G/DL (ref 31–37)
MCV RBC AUTO: 97.4 FL (ref 78–100)
NRBC # BLD: 0 K/UL (ref 0–0.01)
NRBC BLD-RTO: 0 PER 100 WBC
PLATELET # BLD AUTO: 205 K/UL (ref 135–420)
PMV BLD AUTO: 10.7 FL (ref 9.2–11.8)
POTASSIUM SERPL-SCNC: 4.2 MMOL/L (ref 3.5–5.5)
PROT SERPL-MCNC: 6.3 G/DL (ref 6.4–8.2)
RBC # BLD AUTO: 4.65 M/UL (ref 4.35–5.65)
SODIUM SERPL-SCNC: 142 MMOL/L (ref 136–145)
WBC # BLD AUTO: 4.6 K/UL (ref 4.6–13.2)

## 2024-09-11 PROCEDURE — 36415 COLL VENOUS BLD VENIPUNCTURE: CPT

## 2024-09-11 PROCEDURE — 80053 COMPREHEN METABOLIC PANEL: CPT

## 2024-09-11 PROCEDURE — 85027 COMPLETE CBC AUTOMATED: CPT

## 2024-09-11 PROCEDURE — 93005 ELECTROCARDIOGRAM TRACING: CPT

## 2024-09-12 LAB
BACTERIA SPEC CULT: NORMAL
BACTERIA SPEC CULT: NORMAL
SERVICE CMNT-IMP: NORMAL

## 2024-10-23 ENCOUNTER — ANESTHESIA EVENT (OUTPATIENT)
Facility: HOSPITAL | Age: 74
End: 2024-10-23
Payer: MEDICARE

## 2024-10-28 PROBLEM — M48.02 CERVICAL SPINAL STENOSIS: Status: ACTIVE | Noted: 2024-10-28

## 2024-10-28 PROBLEM — M50.30 DDD (DEGENERATIVE DISC DISEASE), CERVICAL: Status: ACTIVE | Noted: 2024-10-28

## 2024-10-28 PROBLEM — M50.20 HNP (HERNIATED NUCLEUS PULPOSUS), CERVICAL: Status: ACTIVE | Noted: 2024-10-28

## 2024-10-29 NOTE — H&P
Patient Name:  NATACHA MAC    YOB: 1950    Chief Complaint:  C6-7 degenerative disc disease.    History of Chief Complaint:  Mr. Mac is a 74 y.o male being seen for C6-7 degenerative disc disease. He says his neck pain is still quite bothersome. He has pain going into his shoulders on either side, right side worse than left side. Lifting his chin up causes pain.He says his neck and left shoulder are still giving him problems and pain, especially when he is rotating to the right. There is no numbness, no tingling, and no bowel or bladder dysfunction. Lifting his head and turning causes pain. He rates his current pain level as an 8/10.Status post L5-S1 decompression and fusion with posterior lumbar interbody fusion done 03/01/23.    Past Medical/Surgical History:    Disease/Disorder Date Side Surgery Date Side Comment   Hypercholesterolemia         Osteoarthritis            Arthroscopy knee         Cholecystectomy      Cancer, skin   Excision         Knee replacement, total         Prostate laser surgery         Spinal fusion, cervical         Spinal fusion, lumbar 03/01/2023  JH 06/05/2023 -L5-S1      Tonsillectomy        Allergies:    Ingredient Reaction Medication Name Comment   Levofloxacin  Sulfa        Current Medications:    Medication Directions   gabapentin 300 mg capsule    hydrocodone 5 mg-acetaminophen 325 mg tablet    metoprolol succinate ER 25 mg tablet,extended release 24 hr    Nexium    simvastatin 20 mg tablet      Social History:      SMOKING  Status Tobacco Type Units Per Day Yrs Used   Never smoker        ALCOHOL  There is a history of alcohol use.   Type: Beer. consumed daily.    Family History:    Disease Detail Family Member Age Cause of Death Comments   Rheumatoid arthritis Father        Review of Systems:    GENERAL:  Patient has no signs of fever or chills. or weight change  HEAD/ENTM:  Patient has no signs of headaches, dizziness, hearing loss, ringing in ears,

## 2024-11-05 ENCOUNTER — ANESTHESIA (OUTPATIENT)
Facility: HOSPITAL | Age: 74
End: 2024-11-05
Payer: MEDICARE

## 2024-11-05 ENCOUNTER — APPOINTMENT (OUTPATIENT)
Facility: HOSPITAL | Age: 74
End: 2024-11-05
Attending: ORTHOPAEDIC SURGERY
Payer: MEDICARE

## 2024-11-05 ENCOUNTER — HOSPITAL ENCOUNTER (OUTPATIENT)
Facility: HOSPITAL | Age: 74
Discharge: HOME | End: 2024-11-05
Attending: ORTHOPAEDIC SURGERY | Admitting: ORTHOPAEDIC SURGERY
Payer: MEDICARE

## 2024-11-05 VITALS
WEIGHT: 156.8 LBS | TEMPERATURE: 97.3 F | SYSTOLIC BLOOD PRESSURE: 111 MMHG | OXYGEN SATURATION: 97 % | RESPIRATION RATE: 16 BRPM | HEART RATE: 73 BPM | DIASTOLIC BLOOD PRESSURE: 67 MMHG | HEIGHT: 72 IN | BODY MASS INDEX: 21.24 KG/M2

## 2024-11-05 DIAGNOSIS — M48.02 CERVICAL SPINAL STENOSIS: Primary | ICD-10-CM

## 2024-11-05 PROBLEM — M50.20 HNP (HERNIATED NUCLEUS PULPOSUS), CERVICAL: Status: RESOLVED | Noted: 2024-10-28 | Resolved: 2024-11-05

## 2024-11-05 LAB
ABO + RH BLD: NORMAL
BLOOD GROUP ANTIBODIES SERPL: NORMAL
SPECIMEN EXP DATE BLD: NORMAL

## 2024-11-05 PROCEDURE — C1713 ANCHOR/SCREW BN/BN,TIS/BN: HCPCS | Performed by: ORTHOPAEDIC SURGERY

## 2024-11-05 PROCEDURE — 36415 COLL VENOUS BLD VENIPUNCTURE: CPT

## 2024-11-05 PROCEDURE — 2580000003 HC RX 258: Performed by: ORTHOPAEDIC SURGERY

## 2024-11-05 PROCEDURE — 3600000002 HC SURGERY LEVEL 2 BASE: Performed by: ORTHOPAEDIC SURGERY

## 2024-11-05 PROCEDURE — 2580000003 HC RX 258: Performed by: NURSE ANESTHETIST, CERTIFIED REGISTERED

## 2024-11-05 PROCEDURE — 7100000001 HC PACU RECOVERY - ADDTL 15 MIN: Performed by: ORTHOPAEDIC SURGERY

## 2024-11-05 PROCEDURE — 6360000002 HC RX W HCPCS: Performed by: NURSE ANESTHETIST, CERTIFIED REGISTERED

## 2024-11-05 PROCEDURE — 77002 NEEDLE LOCALIZATION BY XRAY: CPT

## 2024-11-05 PROCEDURE — 6360000002 HC RX W HCPCS: Performed by: ORTHOPAEDIC SURGERY

## 2024-11-05 PROCEDURE — 86900 BLOOD TYPING SEROLOGIC ABO: CPT

## 2024-11-05 PROCEDURE — 2709999900 HC NON-CHARGEABLE SUPPLY: Performed by: ORTHOPAEDIC SURGERY

## 2024-11-05 PROCEDURE — 3600000012 HC SURGERY LEVEL 2 ADDTL 15MIN: Performed by: ORTHOPAEDIC SURGERY

## 2024-11-05 PROCEDURE — 86850 RBC ANTIBODY SCREEN: CPT

## 2024-11-05 PROCEDURE — 7100000011 HC PHASE II RECOVERY - ADDTL 15 MIN: Performed by: ORTHOPAEDIC SURGERY

## 2024-11-05 PROCEDURE — 7100000000 HC PACU RECOVERY - FIRST 15 MIN: Performed by: ORTHOPAEDIC SURGERY

## 2024-11-05 PROCEDURE — 2500000003 HC RX 250 WO HCPCS: Performed by: NURSE ANESTHETIST, CERTIFIED REGISTERED

## 2024-11-05 PROCEDURE — 3700000001 HC ADD 15 MINUTES (ANESTHESIA): Performed by: ORTHOPAEDIC SURGERY

## 2024-11-05 PROCEDURE — 2720000010 HC SURG SUPPLY STERILE: Performed by: ORTHOPAEDIC SURGERY

## 2024-11-05 PROCEDURE — 7100000010 HC PHASE II RECOVERY - FIRST 15 MIN: Performed by: ORTHOPAEDIC SURGERY

## 2024-11-05 PROCEDURE — 3700000000 HC ANESTHESIA ATTENDED CARE: Performed by: ORTHOPAEDIC SURGERY

## 2024-11-05 PROCEDURE — C1889 IMPLANT/INSERT DEVICE, NOC: HCPCS | Performed by: ORTHOPAEDIC SURGERY

## 2024-11-05 PROCEDURE — 86901 BLOOD TYPING SEROLOGIC RH(D): CPT

## 2024-11-05 DEVICE — IMPLANTABLE DEVICE: Type: IMPLANTABLE DEVICE | Site: SPINE CERVICAL | Status: FUNCTIONAL

## 2024-11-05 DEVICE — SCREW SPNL SD 4X14 MM ANTR CERV VA HELIX REVOLUTION ACP: Type: IMPLANTABLE DEVICE | Site: SPINE CERVICAL | Status: FUNCTIONAL

## 2024-11-05 RX ORDER — HYDROCODONE BITARTRATE AND ACETAMINOPHEN 5; 325 MG/1; MG/1
1-2 TABLET ORAL EVERY 6 HOURS PRN
Qty: 42 TABLET | Refills: 0 | Status: SHIPPED | OUTPATIENT
Start: 2024-11-05 | End: 2024-11-10

## 2024-11-05 RX ORDER — DIPHENHYDRAMINE HYDROCHLORIDE 50 MG/ML
12.5 INJECTION INTRAMUSCULAR; INTRAVENOUS
Status: DISCONTINUED | OUTPATIENT
Start: 2024-11-05 | End: 2024-11-05 | Stop reason: HOSPADM

## 2024-11-05 RX ORDER — SODIUM CHLORIDE 0.9 % (FLUSH) 0.9 %
5-40 SYRINGE (ML) INJECTION EVERY 12 HOURS SCHEDULED
Status: DISCONTINUED | OUTPATIENT
Start: 2024-11-05 | End: 2024-11-05 | Stop reason: HOSPADM

## 2024-11-05 RX ORDER — MIDAZOLAM HYDROCHLORIDE 1 MG/ML
INJECTION, SOLUTION INTRAMUSCULAR; INTRAVENOUS
Status: DISCONTINUED | OUTPATIENT
Start: 2024-11-05 | End: 2024-11-05 | Stop reason: SDUPTHER

## 2024-11-05 RX ORDER — EPHEDRINE SULFATE/0.9% NACL/PF 50 MG/5 ML
SYRINGE (ML) INTRAVENOUS
Status: DISCONTINUED | OUTPATIENT
Start: 2024-11-05 | End: 2024-11-05 | Stop reason: SDUPTHER

## 2024-11-05 RX ORDER — KETOROLAC TROMETHAMINE 30 MG/ML
INJECTION, SOLUTION INTRAMUSCULAR; INTRAVENOUS
Status: DISCONTINUED | OUTPATIENT
Start: 2024-11-05 | End: 2024-11-05 | Stop reason: SDUPTHER

## 2024-11-05 RX ORDER — FENTANYL CITRATE 50 UG/ML
25 INJECTION, SOLUTION INTRAMUSCULAR; INTRAVENOUS EVERY 5 MIN PRN
Status: DISCONTINUED | OUTPATIENT
Start: 2024-11-05 | End: 2024-11-05 | Stop reason: HOSPADM

## 2024-11-05 RX ORDER — ONDANSETRON 2 MG/ML
INJECTION INTRAMUSCULAR; INTRAVENOUS
Status: DISCONTINUED | OUTPATIENT
Start: 2024-11-05 | End: 2024-11-05 | Stop reason: SDUPTHER

## 2024-11-05 RX ORDER — OXYCODONE HYDROCHLORIDE 5 MG/1
5 TABLET ORAL
Status: DISCONTINUED | OUTPATIENT
Start: 2024-11-05 | End: 2024-11-05 | Stop reason: HOSPADM

## 2024-11-05 RX ORDER — HYDROMORPHONE HYDROCHLORIDE 1 MG/ML
0.5 INJECTION, SOLUTION INTRAMUSCULAR; INTRAVENOUS; SUBCUTANEOUS EVERY 5 MIN PRN
Status: DISCONTINUED | OUTPATIENT
Start: 2024-11-05 | End: 2024-11-05 | Stop reason: HOSPADM

## 2024-11-05 RX ORDER — SODIUM CHLORIDE, SODIUM LACTATE, POTASSIUM CHLORIDE, CALCIUM CHLORIDE 600; 310; 30; 20 MG/100ML; MG/100ML; MG/100ML; MG/100ML
INJECTION, SOLUTION INTRAVENOUS CONTINUOUS
Status: DISCONTINUED | OUTPATIENT
Start: 2024-11-05 | End: 2024-11-05 | Stop reason: HOSPADM

## 2024-11-05 RX ORDER — NALOXONE HYDROCHLORIDE 0.4 MG/ML
INJECTION, SOLUTION INTRAMUSCULAR; INTRAVENOUS; SUBCUTANEOUS PRN
Status: DISCONTINUED | OUTPATIENT
Start: 2024-11-05 | End: 2024-11-05 | Stop reason: HOSPADM

## 2024-11-05 RX ORDER — SUCCINYLCHOLINE/SOD CL,ISO/PF 100 MG/5ML
SYRINGE (ML) INTRAVENOUS
Status: DISCONTINUED | OUTPATIENT
Start: 2024-11-05 | End: 2024-11-05 | Stop reason: SDUPTHER

## 2024-11-05 RX ORDER — BUPIVACAINE HYDROCHLORIDE 2.5 MG/ML
INJECTION, SOLUTION EPIDURAL; INFILTRATION; INTRACAUDAL PRN
Status: DISCONTINUED | OUTPATIENT
Start: 2024-11-05 | End: 2024-11-05 | Stop reason: ALTCHOICE

## 2024-11-05 RX ORDER — SODIUM CHLORIDE 0.9 % (FLUSH) 0.9 %
5-40 SYRINGE (ML) INJECTION PRN
Status: DISCONTINUED | OUTPATIENT
Start: 2024-11-05 | End: 2024-11-05 | Stop reason: HOSPADM

## 2024-11-05 RX ORDER — IPRATROPIUM BROMIDE AND ALBUTEROL SULFATE 2.5; .5 MG/3ML; MG/3ML
1 SOLUTION RESPIRATORY (INHALATION)
Status: DISCONTINUED | OUTPATIENT
Start: 2024-11-05 | End: 2024-11-05 | Stop reason: HOSPADM

## 2024-11-05 RX ORDER — ONDANSETRON 2 MG/ML
4 INJECTION INTRAMUSCULAR; INTRAVENOUS
Status: DISCONTINUED | OUTPATIENT
Start: 2024-11-05 | End: 2024-11-05 | Stop reason: HOSPADM

## 2024-11-05 RX ORDER — PHENYLEPHRINE HCL IN 0.9% NACL 1 MG/10 ML
SYRINGE (ML) INTRAVENOUS
Status: DISCONTINUED | OUTPATIENT
Start: 2024-11-05 | End: 2024-11-05 | Stop reason: SDUPTHER

## 2024-11-05 RX ORDER — FENTANYL CITRATE 50 UG/ML
INJECTION, SOLUTION INTRAMUSCULAR; INTRAVENOUS
Status: DISCONTINUED | OUTPATIENT
Start: 2024-11-05 | End: 2024-11-05 | Stop reason: SDUPTHER

## 2024-11-05 RX ORDER — DEXAMETHASONE SODIUM PHOSPHATE 4 MG/ML
INJECTION, SOLUTION INTRA-ARTICULAR; INTRALESIONAL; INTRAMUSCULAR; INTRAVENOUS; SOFT TISSUE
Status: DISCONTINUED | OUTPATIENT
Start: 2024-11-05 | End: 2024-11-05 | Stop reason: SDUPTHER

## 2024-11-05 RX ORDER — HYDROCODONE BITARTRATE AND ACETAMINOPHEN 5; 325 MG/1; MG/1
1-2 TABLET ORAL EVERY 6 HOURS PRN
Qty: 42 TABLET | Refills: 0 | Status: SHIPPED | OUTPATIENT
Start: 2024-11-05 | End: 2024-11-05

## 2024-11-05 RX ORDER — LIDOCAINE HYDROCHLORIDE 20 MG/ML
INJECTION, SOLUTION EPIDURAL; INFILTRATION; INTRACAUDAL; PERINEURAL
Status: DISCONTINUED | OUTPATIENT
Start: 2024-11-05 | End: 2024-11-05 | Stop reason: SDUPTHER

## 2024-11-05 RX ORDER — ROCURONIUM BROMIDE 10 MG/ML
INJECTION, SOLUTION INTRAVENOUS
Status: DISCONTINUED | OUTPATIENT
Start: 2024-11-05 | End: 2024-11-05 | Stop reason: SDUPTHER

## 2024-11-05 RX ORDER — SODIUM CHLORIDE 9 MG/ML
INJECTION, SOLUTION INTRAVENOUS PRN
Status: DISCONTINUED | OUTPATIENT
Start: 2024-11-05 | End: 2024-11-05 | Stop reason: HOSPADM

## 2024-11-05 RX ORDER — MEPERIDINE HYDROCHLORIDE 50 MG/ML
12.5 INJECTION INTRAMUSCULAR; INTRAVENOUS; SUBCUTANEOUS AS NEEDED
Status: DISCONTINUED | OUTPATIENT
Start: 2024-11-05 | End: 2024-11-05 | Stop reason: HOSPADM

## 2024-11-05 RX ORDER — PROPOFOL 10 MG/ML
INJECTION, EMULSION INTRAVENOUS
Status: DISCONTINUED | OUTPATIENT
Start: 2024-11-05 | End: 2024-11-05 | Stop reason: SDUPTHER

## 2024-11-05 RX ORDER — SODIUM CHLORIDE 9 MG/ML
INJECTION, SOLUTION INTRAVENOUS CONTINUOUS
Status: DISCONTINUED | OUTPATIENT
Start: 2024-11-05 | End: 2024-11-05 | Stop reason: HOSPADM

## 2024-11-05 RX ORDER — DROPERIDOL 2.5 MG/ML
0.62 INJECTION, SOLUTION INTRAMUSCULAR; INTRAVENOUS
Status: DISCONTINUED | OUTPATIENT
Start: 2024-11-05 | End: 2024-11-05 | Stop reason: HOSPADM

## 2024-11-05 RX ORDER — LABETALOL HYDROCHLORIDE 5 MG/ML
10 INJECTION, SOLUTION INTRAVENOUS
Status: DISCONTINUED | OUTPATIENT
Start: 2024-11-05 | End: 2024-11-05 | Stop reason: HOSPADM

## 2024-11-05 RX ADMIN — Medication 100 MG: at 15:40

## 2024-11-05 RX ADMIN — SUGAMMADEX 200 MG: 100 INJECTION, SOLUTION INTRAVENOUS at 17:03

## 2024-11-05 RX ADMIN — Medication 200 MCG: at 15:44

## 2024-11-05 RX ADMIN — LIDOCAINE HYDROCHLORIDE 80 MG: 20 INJECTION, SOLUTION EPIDURAL; INFILTRATION; INTRACAUDAL; PERINEURAL at 15:39

## 2024-11-05 RX ADMIN — ROCURONIUM BROMIDE 50 MG: 10 INJECTION, SOLUTION INTRAVENOUS at 16:00

## 2024-11-05 RX ADMIN — KETOROLAC TROMETHAMINE 15 MG: 30 INJECTION, SOLUTION INTRAMUSCULAR; INTRAVENOUS at 17:00

## 2024-11-05 RX ADMIN — HYDROMORPHONE HYDROCHLORIDE 0.5 MG: 1 INJECTION, SOLUTION INTRAMUSCULAR; INTRAVENOUS; SUBCUTANEOUS at 15:31

## 2024-11-05 RX ADMIN — Medication 10 MG: at 15:51

## 2024-11-05 RX ADMIN — PHENYLEPHRINE HYDROCHLORIDE 40 MCG/MIN: 10 INJECTION INTRAVENOUS at 16:02

## 2024-11-05 RX ADMIN — PROPOFOL 150 MG: 10 INJECTION, EMULSION INTRAVENOUS at 15:40

## 2024-11-05 RX ADMIN — DEXAMETHASONE SODIUM PHOSPHATE 4 MG: 4 INJECTION, SOLUTION INTRAMUSCULAR; INTRAVENOUS at 15:42

## 2024-11-05 RX ADMIN — HYDROMORPHONE HYDROCHLORIDE 0.5 MG: 1 INJECTION, SOLUTION INTRAMUSCULAR; INTRAVENOUS; SUBCUTANEOUS at 17:12

## 2024-11-05 RX ADMIN — Medication 200 MCG: at 15:55

## 2024-11-05 RX ADMIN — FENTANYL CITRATE 100 MCG: 50 INJECTION, SOLUTION INTRAMUSCULAR; INTRAVENOUS at 15:40

## 2024-11-05 RX ADMIN — ONDANSETRON HYDROCHLORIDE 4 MG: 2 INJECTION INTRAMUSCULAR; INTRAVENOUS at 16:56

## 2024-11-05 RX ADMIN — SODIUM CHLORIDE: 9 INJECTION, SOLUTION INTRAVENOUS at 10:46

## 2024-11-05 RX ADMIN — MIDAZOLAM 2 MG: 1 INJECTION INTRAMUSCULAR; INTRAVENOUS at 15:31

## 2024-11-05 RX ADMIN — Medication 100 MCG: at 16:24

## 2024-11-05 RX ADMIN — WATER 2000 MG: 1 INJECTION INTRAMUSCULAR; INTRAVENOUS; SUBCUTANEOUS at 15:42

## 2024-11-05 ASSESSMENT — PAIN - FUNCTIONAL ASSESSMENT
PAIN_FUNCTIONAL_ASSESSMENT: 0-10
PAIN_FUNCTIONAL_ASSESSMENT: NONE - DENIES PAIN

## 2024-11-05 ASSESSMENT — PAIN DESCRIPTION - DESCRIPTORS: DESCRIPTORS: DULL

## 2024-11-05 NOTE — INTERVAL H&P NOTE
Update History & Physical    The patient's History and Physical was reviewed with the patient and I examined the patient. There was no change. The surgical site was confirmed by the patient and me.     Plan: The risks, benefits, expected outcome, and alternative to the recommended procedure have been discussed with the patient. Patient understands and wants to proceed with the procedure.     Electronically signed by RAÚL VARGAS MD on 11/5/2024 at 10:35 AM

## 2024-11-05 NOTE — OP NOTE
Operative Note      Patient: Marlon Underwood               Sex: male          DOA: 11/5/2024         YOB: 1950      Age:  74 y.o.       Preoperative Diagnosis: Spondylolisthesis of cervical region [M43.12]    Postoperative Diagnosis:  * No post-op diagnosis entered *    Surgeon: Surgeons and Role:     * Jonathon Peng MD - Primary  Assistant: Elisa CRAMER-SHABANA    OR Assitance: Surgical Assistant: Mary Fernandez  Scrub Person First: Tong Dotson  Physician Assistant: Elisa Severino PA    Anesthesia:  General    Procedure:  Procedure(s):  CERVICAL 6-7 ANTERIOR CERVICAL DISC FUSION WITH C-ARM \"SPEC POP\"     Estimated Blood Loss: 50cc                 Implants:   Implant Name Type Inv. Item Serial No.  Lot No. LRB No. Used Action   ALLOGRAFT BNE SYR XSM 1.25CC DBM FIBER OSTEOSTRAND PLUS - [id number]  ALLOGRAFT BNE SYR XSM 1.25CC DBM FIBER OSTEOSTRAND PLUS [id number] [employer] [facility] [id number] N/A 1 Implanted   CAGE SPNL 7 DEG [id number] MM CERV COHERE - [id number]  CAGE SPNL 7 DEG [id number] MM CERV COHERE  [employer] [id number] N/A 1 Implanted   SCREW SPNL SD 4X14 MM ANTR CERV VA HELIX REVOLUTION ACP - [id number]  SCREW SPNL SD 4X14 MM ANTR CERV VA HELIX REVOLUTION ACP  [employer]WD [id number] N/A 4 Implanted   PLATE SPNL L22MM LEV 1 ANT CERV HELIX REVOLUTION - [id number]  PLATE SPNL L22MM LEV 1 ANT CERV HELIX REVOLUTION  [employer]WD [id number] N/A 1 Implanted       Specimens: * No specimens in log *    Drains: BINTA           Complications:  None              Indications: This is a 74 y.o. year-old male who presents with significant neck and arm pain worsening ability to do activities of daily living. X-rays and MRI scan revealing spinal stenosis, degenerative disk disease and disk herniation. Having failed conservative care, he comes for operative intervention.     Procedure Details:   After appropriate informed consent was obtained, the patient was taken to the operating suite and

## 2024-11-05 NOTE — DISCHARGE INSTRUCTIONS
keep a list of the medicines you take.  How can you care for yourself at home?  Take your pain medicine as soon as you have pain. It works better if you take it before the pain gets bad.  Call your doctor if you have any problems with your medicine.  To prevent dehydration, drink plenty of fluids. Choose water and other clear liquids until you feel better. If you have kidney, heart, or liver disease and have to limit fluids, talk with your doctor before you increase the amount of fluids you drink.  When you are able to eat, try clear soups, mild foods, and liquids until all symptoms are gone for 12 to 48 hours. Other good choices include dry toast, crackers, cooked cereal, and gelatin dessert, such as Jell-O.  Do not smoke. Smoking and being around smoke can make nausea worse.   When should you call for help?    Call your doctor now or seek immediate medical care if:    You have new or worse nausea or vomiting.     You are too sick to your stomach to drink any fluids.     You cannot keep down fluids.     You have symptoms of dehydration, such as:  Dry eyes and a dry mouth.  Passing only a little urine.  Feeling thirstier than usual.     You are dizzy or lightheaded, or you feel like you may faint.           Infection After Surgery: Care Instructions  Overview  After surgery, an infection is always possible. It doesn't mean that the surgery didn't go well.  How can you care for yourself at home?  Make sure your surgeon knows about the infection, especially if you saw another doctor about your symptoms.  If your doctor prescribed antibiotics, take them as directed. Do not stop taking them just because you feel better. You need to take the full course of antibiotics.  Keep the skin clean and dry.  You may have a bandage over the cut (incision). A bandage helps the incision heal and protects it. Your doctor will tell you how to take care of this. Keep it clean and dry. You may have drainage from the wound.     Call your

## 2024-11-05 NOTE — ANESTHESIA PRE PROCEDURE
Department of Anesthesiology  Preprocedure Note       Name:  Marlon Underwood   Age:  74 y.o.  :  1950                                          MRN:  [id number]         Date:  2024      Surgeon: Surgeon(s):  Jonathon Peng MD    Procedure: Procedure(s):  CERVICAL 6-7 ANTERIOR CERVICAL DISC FUSION WITH C-ARM \"SPEC POP\"    Medications prior to admission:   Prior to Admission medications    Medication Sig Start Date End Date Taking? Authorizing Provider   esomeprazole Magnesium (NEXIUM) 20 MG PACK Take 1 packet by mouth nightly Indications: GERD   Yes Jordy Sorto MD   Multiple Vitamins-Minerals (CENTRUM SILVER 50+MEN) TABS Take 1 tablet by mouth daily   Yes Jordy Sorto MD   carboxymethylcellulose (REFRESH TEARS) 0.5 % SOLN ophthalmic solution Place 1 drop into both eyes as needed (dry eyes)   Yes Jordy Sorto MD   metoprolol succinate (TOPROL XL) 25 MG extended release tablet Take 1 tablet by mouth 2 times daily Indications: heart arrthemia 21  Yes Automatic Reconciliation, Ar   aspirin 81 MG EC tablet Take 1 tablet by mouth daily    Jordy Sorto MD   Magnesium Oxide -Mg Supplement 200 MG TABS Take 1 tablet by mouth daily    Jordy Sorto MD   Multiple Vitamins-Minerals (ICAPS AREDS 2 PO) Take 1 capsule by mouth daily    Jordy Sorto MD   acetaminophen (TYLENOL) 500 MG tablet Take 1 tablet by mouth every 6 hours as needed for Pain  Patient not taking: Reported on 2024    Jordy Sorto MD   bisacodyl (DULCOLAX) 5 MG EC tablet Take 1 tablet by mouth daily as needed for Constipation    Automatic Reconciliation, Ar       Current medications:    Current Facility-Administered Medications   Medication Dose Route Frequency Provider Last Rate Last Admin   • 0.9 % sodium chloride infusion   IntraVENous Continuous Jonathon Peng MD 50 mL/hr at 24 1046 New Bag at 24 1046   • ceFAZolin (ANCEF) 2,000 mg in sterile water 20 mL

## 2024-11-05 NOTE — ANESTHESIA POSTPROCEDURE EVALUATION
Post-Anesthesia Evaluation and Assessment    Cardiovascular Function/Vital Signs  Visit Vitals  /71   Pulse 86   Temp 97.7 °F (36.5 °C) (Tympanic)   Resp 13   Ht 1.829 m (6')   Wt 71.1 kg (156 lb 12.8 oz)   SpO2 95%   BMI 21.27 kg/m²       Patient is status post Procedure(s):  CERVICAL 6-7 ANTERIOR CERVICAL DISC FUSION WITH C-ARM \"SPEC POP\".    Nausea/Vomiting: Controlled.    Postoperative hydration reviewed and adequate.    Pain:      Managed.    Neurological Status:       At baseline.    Mental Status and Level of Consciousness: Progressing to baseline appropriately     Pulmonary Status:       Adequate oxygenation and airway patent.    Complications related to anesthesia: None    Post-anesthesia assessment completed. No concerns.        Signed By: RHEA PRITCHARD MD    November 5, 2024

## 2024-11-05 NOTE — PERIOP NOTE
Called patient wife and gave instructions about medications being sent to another pharmacy [facility] on [address] patient wife verbalizes understanding and agrees with plan

## 2024-11-05 NOTE — PERIOP NOTE
TRANSFER - IN REPORT:    Verbal report received from OR RN and CRNA  on Marlon Underwood  being received from OR  for routine post-op      Report consisted of patient's Situation, Background, Assessment and   Recommendations(SBAR).     Information from the following report(s) Surgery Report, Intake/Output, and MAR was reviewed with the receiving nurse.    Opportunity for questions and clarification was provided.      Assessment completed upon patient's arrival to unit and care assumed.

## 2024-11-05 NOTE — PERIOP NOTE
Reviewed PTA medication list with patient/caregiver and patient/caregiver denies any additional medications.     Patient admits to having a responsible adult care for them at home for at least 24 hours after surgery.    Patient encouraged to use gown warming system and informed that using said warming gown to regulate body temperature prior to a procedure has been shown to help reduce the risks of blood clots and infection.    Patient's pharmacy of choice verified and documented in PTA medication section.    Dual skin assessment & fall risk band verification completed with A Anais SHABAZZ.

## 2024-11-06 NOTE — PERIOP NOTE
Patient armband removed and shredded     The discharge information has been reviewed with the patient and caregiver.  The patient and caregiver verbalized understanding.  Discharge medications reviewed with the patient and guardian and appropriate educational materials and side effects teaching were provided.

## 2025-01-14 ENCOUNTER — HOSPITAL ENCOUNTER (OUTPATIENT)
Age: 75
Discharge: HOME | End: 2025-01-17
Payer: MEDICARE

## 2025-01-14 DIAGNOSIS — R97.20 ELEVATED PSA: ICD-10-CM

## 2025-01-14 LAB
APPEARANCE UR: CLEAR
BACTERIA URNS QL MICRO: NEGATIVE /HPF
BILIRUB UR QL: NEGATIVE
COLOR UR: YELLOW
GLUCOSE UR STRIP.AUTO-MCNC: NEGATIVE MG/DL
HGB UR QL STRIP: NEGATIVE
KETONES UR QL STRIP.AUTO: NEGATIVE MG/DL
LEUKOCYTE ESTERASE UR QL STRIP.AUTO: NEGATIVE
NITRITE UR QL STRIP.AUTO: NEGATIVE
PH UR STRIP: 6 (ref 5–8)
PROT UR STRIP-MCNC: NEGATIVE MG/DL
RBC #/AREA URNS HPF: NORMAL /HPF (ref 0–2)
SP GR UR REFRACTOMETRY: 1.01 (ref 1–1.03)
UROBILINOGEN UR QL STRIP.AUTO: 0.2 EU/DL (ref 0.2–1)
WBC URNS QL MICRO: NORMAL /HPF (ref 0–4)

## 2025-01-14 PROCEDURE — 81001 URINALYSIS AUTO W/SCOPE: CPT

## 2025-01-14 PROCEDURE — 87086 URINE CULTURE/COLONY COUNT: CPT

## 2025-01-15 LAB
BACTERIA SPEC CULT: NORMAL
Lab: NORMAL

## (undated) DEVICE — SHEET,DRAPE,70X100,STERILE: Brand: MEDLINE

## (undated) DEVICE — SOLUTION IRRIG 500ML 0.9% SOD CHLO USP POUR PLAS BTL

## (undated) DEVICE — GLOVE ORANGE PI 7   MSG9070

## (undated) DEVICE — AGENT HEMOSTATIC SURGIFLOW MATRIX KIT W/THROMBIN

## (undated) DEVICE — HALTER TRACTION HD W/ TRI COTTON LINING FOAM LTX

## (undated) DEVICE — Device

## (undated) DEVICE — GLOVE ORANGE PI 8 1/2   MSG9085

## (undated) DEVICE — SYRINGE MED 50ML LUERLOCK TIP

## (undated) DEVICE — LIQUIBAND RAPID ADHESIVE 36/CS 0.8ML: Brand: MEDLINE

## (undated) DEVICE — SHEET,DRAPE,40X58,STERILE: Brand: MEDLINE

## (undated) DEVICE — 3.0MM PRECISION NEURO (MATCH HEAD)

## (undated) DEVICE — SOLUTION SURG PREP 26 CC PURPREP

## (undated) DEVICE — GLOVE SURG SZ 65 THK91MIL LTX FREE SYN POLYISOPRENE

## (undated) DEVICE — SUTURE VICRYL + SZ 2-0 L18IN ABSRB VLT CT-2 1/2 CIR TAPERCUT VCP726D

## (undated) DEVICE — ELECTRODE PT RET AD L9FT HI MOIST COND ADH HYDRGEL CORDED

## (undated) DEVICE — PACK SURG CUST ANTR CERV DISCECTOMY

## (undated) DEVICE — SCREW DISTRACTOR L14MM MAXCESS-C

## (undated) DEVICE — HOLDER RESTRAINT LIMB UNIV FOAM PR D RNG SINGLE STRP LF

## (undated) DEVICE — PIN FIX ANTR CERV ENGAGING FOR PLATING SYS ARCHON

## (undated) DEVICE — SYRINGE IRRIG 60ML SFT PLIABLE BLB EZ TO GRP 1 HND USE W/

## (undated) DEVICE — GLOVE ORANGE PI 8   MSG9080

## (undated) DEVICE — DRAIN SURG 7FR END PERF 1/8IN SIL RND SMOOTH HEAT POLISHED

## (undated) DEVICE — GARMENT,MEDLINE,DVT,INT,CALF,MED, GEN2: Brand: MEDLINE

## (undated) DEVICE — RESERVOIR,SUCTION,100CC,SILICONE: Brand: MEDLINE

## (undated) DEVICE — SUTURE PROL SZ 3-0 L18IN NONABSORBABLE BLU L19MM PC-5 3/8 8632G